# Patient Record
Sex: FEMALE | Race: WHITE | URBAN - METROPOLITAN AREA
[De-identification: names, ages, dates, MRNs, and addresses within clinical notes are randomized per-mention and may not be internally consistent; named-entity substitution may affect disease eponyms.]

---

## 2021-02-24 ENCOUNTER — WEB ENCOUNTER (OUTPATIENT)
Dept: URBAN - METROPOLITAN AREA CLINIC 107 | Facility: CLINIC | Age: 41
End: 2021-02-24

## 2021-02-24 ENCOUNTER — LAB OUTSIDE AN ENCOUNTER (OUTPATIENT)
Dept: URBAN - METROPOLITAN AREA CLINIC 107 | Facility: CLINIC | Age: 41
End: 2021-02-24

## 2021-02-24 ENCOUNTER — OFFICE VISIT (OUTPATIENT)
Dept: URBAN - METROPOLITAN AREA CLINIC 107 | Facility: CLINIC | Age: 41
End: 2021-02-24
Payer: COMMERCIAL

## 2021-02-24 VITALS
BODY MASS INDEX: 26.19 KG/M2 | WEIGHT: 172.8 LBS | DIASTOLIC BLOOD PRESSURE: 77 MMHG | TEMPERATURE: 98.3 F | HEIGHT: 68 IN | HEART RATE: 71 BPM | SYSTOLIC BLOOD PRESSURE: 122 MMHG | RESPIRATION RATE: 20 BRPM

## 2021-02-24 DIAGNOSIS — K62.5 RECTAL BLEEDING: ICD-10-CM

## 2021-02-24 DIAGNOSIS — R19.4 CHANGE IN BOWEL HABITS: ICD-10-CM

## 2021-02-24 PROCEDURE — 99204 OFFICE O/P NEW MOD 45 MIN: CPT | Performed by: INTERNAL MEDICINE

## 2021-02-24 RX ORDER — ACETAMINOPHEN 500 MG
AS DIRECTED TABLET ORAL
Status: ACTIVE | COMMUNITY

## 2021-02-24 RX ORDER — SODIUM, POTASSIUM,MAG SULFATES 17.5-3.13G
354ML SOLUTION, RECONSTITUTED, ORAL ORAL
Qty: 354 MILLILITER | OUTPATIENT
Start: 2021-02-24 | End: 2021-03-26

## 2021-02-24 NOTE — HPI-OTHER HISTORIES
Maki Woodson is a 40-year-old female who presents as a new patient evaluation because of abnormal bowel habits.  The patient states that she had problems with her digestive system being "messed up" in January 2021.  Her symptoms were primarily diarrhea, crampy abdominal pain, and what she terms a "brain fog".  She is uncertain what caused this.  She had 4 colonic performed, lost 8 pounds of stool in the context of doing this, and subsequently felt better.  She was concerned that she might have an anal fissure has been told that she had a redundant colon.  She is also concerned about the possibility of food intolerances, particularly dietary gluten, which she seems to have had some difficulties tolerating.  She avoids dairy products, as they clearly cause loose stools and problems with gas etc.  She is taking a psyllium-based fiber supplement as well as magnesium citrate and is having a formed about 3 times a day now.  She denies rectal bleeding although she has had some blood on the toilet tissue at times.  She denies rectal pain.  She does note increased stress lately, as well as some degree of anxiety and depression.  She feels these things may contribute to her symptoms.  She has no family history of colon cancer or colon polyps, no family history of Crohn's disease.

## 2021-03-23 ENCOUNTER — OFFICE VISIT (OUTPATIENT)
Dept: URBAN - METROPOLITAN AREA SURGERY CENTER 25 | Facility: SURGERY CENTER | Age: 41
End: 2021-03-23
Payer: COMMERCIAL

## 2021-03-23 ENCOUNTER — CLAIMS CREATED FROM THE CLAIM WINDOW (OUTPATIENT)
Dept: URBAN - METROPOLITAN AREA CLINIC 4 | Facility: CLINIC | Age: 41
End: 2021-03-23
Payer: COMMERCIAL

## 2021-03-23 DIAGNOSIS — K64.0 FIRST DEGREE HEMORRHOIDS: ICD-10-CM

## 2021-03-23 DIAGNOSIS — D12.2 ADENOMA OF ASCENDING COLON: ICD-10-CM

## 2021-03-23 DIAGNOSIS — K63.89 MASS OF HEPATIC FLEXURE OF COLON: ICD-10-CM

## 2021-03-23 DIAGNOSIS — D12.2 BENIGN NEOPLASM OF ASCENDING COLON: ICD-10-CM

## 2021-03-23 PROCEDURE — G8907 PT DOC NO EVENTS ON DISCHARG: HCPCS | Performed by: INTERNAL MEDICINE

## 2021-03-23 PROCEDURE — 45385 COLONOSCOPY W/LESION REMOVAL: CPT | Performed by: INTERNAL MEDICINE

## 2021-03-23 PROCEDURE — 88305 TISSUE EXAM BY PATHOLOGIST: CPT | Performed by: PATHOLOGY

## 2021-03-23 RX ORDER — ACETAMINOPHEN 500 MG
AS DIRECTED TABLET ORAL
Status: ACTIVE | COMMUNITY

## 2021-03-23 RX ORDER — SODIUM, POTASSIUM,MAG SULFATES 17.5-3.13G
354ML SOLUTION, RECONSTITUTED, ORAL ORAL
Qty: 354 MILLILITER | Status: ACTIVE | COMMUNITY
Start: 2021-02-24 | End: 2021-03-26

## 2021-04-14 ENCOUNTER — WEB ENCOUNTER (OUTPATIENT)
Dept: URBAN - METROPOLITAN AREA CLINIC 113 | Facility: CLINIC | Age: 41
End: 2021-04-14

## 2021-04-14 ENCOUNTER — OFFICE VISIT (OUTPATIENT)
Dept: URBAN - METROPOLITAN AREA CLINIC 113 | Facility: CLINIC | Age: 41
End: 2021-04-14
Payer: COMMERCIAL

## 2021-04-14 VITALS
DIASTOLIC BLOOD PRESSURE: 74 MMHG | WEIGHT: 176 LBS | RESPIRATION RATE: 18 BRPM | BODY MASS INDEX: 26.67 KG/M2 | SYSTOLIC BLOOD PRESSURE: 114 MMHG | TEMPERATURE: 98.1 F | HEART RATE: 67 BPM | HEIGHT: 68 IN

## 2021-04-14 DIAGNOSIS — K57.30 COLON, DIVERTICULOSIS: ICD-10-CM

## 2021-04-14 DIAGNOSIS — R14.0 ABDOMINAL BLOATING: ICD-10-CM

## 2021-04-14 DIAGNOSIS — Z86.010 HISTORY OF ADENOMATOUS POLYP OF COLON: ICD-10-CM

## 2021-04-14 DIAGNOSIS — K59.00 UNSPECIFIED CONSTIPATION: ICD-10-CM

## 2021-04-14 DIAGNOSIS — L29.0 PRURITUS ANI: ICD-10-CM

## 2021-04-14 PROCEDURE — 99213 OFFICE O/P EST LOW 20 MIN: CPT | Performed by: NURSE PRACTITIONER

## 2021-04-14 RX ORDER — ACETAMINOPHEN 500 MG
AS DIRECTED TABLET ORAL
Status: ACTIVE | COMMUNITY

## 2021-04-14 NOTE — HPI-OTHER HISTORIES
Colonoscopy 3/23/2021: BBPS 7, sigmoid and descending diverticulosis, removal of a 13 mm ascending sessile serrated adenoma, removal of a 7 mm descending sessile hyperplastic polyp, moderate grade 1 nonbleeding internal hemorrhoids.  Surveillance recommended in 2024.

## 2021-04-14 NOTE — HPI-TODAY'S VISIT:
This is a 40-year-old female with a history of a change in bowel habits and rectal bleeding presenting for follow-up.   She was initially seen 2/24/2021 for abnormal bowel habits that began in January.  She reported "brain fog."  Diarrhea, crampy abdominal pain, and she had undergone for colonic stent and lost 8 pounds in stool over the course of these procedures.  She was using a fiber supplement and magnesium citrate.  She was having a formed bowel movement 3 times a day at the time of her visit.  She reported occasional red blood on the tissue.  She was instructed to continue fiber and to use milk of magnesia or magnesium citrate as needed.  She was scheduled for a colonoscopy; results below.    She is compliant with daily fiber.  She is having a bowel movement most days.  She denies hard stools or straining.  She has increased water intake which is also been helpful.  She denies gas but reports persistent bloating.  She denies abdominal pain or red blood per rectum.  She continues to report perianal itching.  She has tried using cortisone cream which has been somewhat helpful.  She feels as though the area is inflamed.  This inflammation extends into the vaginal area.  She denies perianal swelling or proctalgia.  She is using sitz  baths without significant improvement.

## 2021-04-17 ENCOUNTER — DASHBOARD ENCOUNTERS (OUTPATIENT)
Age: 41
End: 2021-04-17

## 2021-04-17 PROBLEM — 14760008: Status: ACTIVE | Noted: 2021-04-17

## 2021-04-17 PROBLEM — 429047008: Status: ACTIVE | Noted: 2021-04-17

## 2021-04-17 PROBLEM — 733657002: Status: ACTIVE | Noted: 2021-04-17

## 2021-04-17 PROBLEM — 116289008: Status: ACTIVE | Noted: 2021-04-17

## 2021-04-17 PROBLEM — 90446007: Status: ACTIVE | Noted: 2021-04-17
